# Patient Record
Sex: MALE | Race: BLACK OR AFRICAN AMERICAN | Employment: UNEMPLOYED | URBAN - METROPOLITAN AREA
[De-identification: names, ages, dates, MRNs, and addresses within clinical notes are randomized per-mention and may not be internally consistent; named-entity substitution may affect disease eponyms.]

---

## 2021-04-07 ENCOUNTER — HOSPITAL ENCOUNTER (OUTPATIENT)
Age: 71
Setting detail: OBSERVATION
Discharge: STILL ADMITTED OR ADMINISTRATIVE | End: 2021-04-08
Attending: STUDENT IN AN ORGANIZED HEALTH CARE EDUCATION/TRAINING PROGRAM | Admitting: FAMILY MEDICINE

## 2021-04-07 ENCOUNTER — APPOINTMENT (OUTPATIENT)
Dept: CT IMAGING | Age: 71
End: 2021-04-07
Attending: EMERGENCY MEDICINE

## 2021-04-07 ENCOUNTER — APPOINTMENT (OUTPATIENT)
Dept: GENERAL RADIOLOGY | Age: 71
End: 2021-04-07
Attending: STUDENT IN AN ORGANIZED HEALTH CARE EDUCATION/TRAINING PROGRAM

## 2021-04-07 DIAGNOSIS — E87.1 HYPONATREMIA: Primary | ICD-10-CM

## 2021-04-07 DIAGNOSIS — R26.81 GAIT INSTABILITY: ICD-10-CM

## 2021-04-07 DIAGNOSIS — R41.82 ALTERED MENTAL STATUS, UNSPECIFIED ALTERED MENTAL STATUS TYPE: ICD-10-CM

## 2021-04-07 LAB
APPEARANCE UR: CLEAR
BACTERIA URNS QL MICRO: NEGATIVE /HPF
BILIRUB UR QL: NEGATIVE
BNP SERPL-MCNC: 659 PG/ML
COLOR UR: ABNORMAL
EPITH CASTS URNS QL MICRO: ABNORMAL /LPF
GLUCOSE UR STRIP.AUTO-MCNC: 500 MG/DL
HGB UR QL STRIP: ABNORMAL
HYALINE CASTS URNS QL MICRO: ABNORMAL /LPF (ref 0–5)
KETONES UR QL STRIP.AUTO: ABNORMAL MG/DL
LEUKOCYTE ESTERASE UR QL STRIP.AUTO: NEGATIVE
NITRITE UR QL STRIP.AUTO: NEGATIVE
PH UR STRIP: 6.5 [PH] (ref 5–8)
PROT UR STRIP-MCNC: 30 MG/DL
RBC #/AREA URNS HPF: ABNORMAL /HPF (ref 0–5)
SP GR UR REFRACTOMETRY: 1.01 (ref 1–1.03)
UR CULT HOLD, URHOLD: NORMAL
UROBILINOGEN UR QL STRIP.AUTO: 0.2 EU/DL (ref 0.2–1)
WBC URNS QL MICRO: ABNORMAL /HPF (ref 0–4)

## 2021-04-07 PROCEDURE — 84484 ASSAY OF TROPONIN QUANT: CPT

## 2021-04-07 PROCEDURE — 74011250636 HC RX REV CODE- 250/636: Performed by: STUDENT IN AN ORGANIZED HEALTH CARE EDUCATION/TRAINING PROGRAM

## 2021-04-07 PROCEDURE — 96360 HYDRATION IV INFUSION INIT: CPT

## 2021-04-07 PROCEDURE — 83880 ASSAY OF NATRIURETIC PEPTIDE: CPT

## 2021-04-07 PROCEDURE — 99284 EMERGENCY DEPT VISIT MOD MDM: CPT

## 2021-04-07 PROCEDURE — 70450 CT HEAD/BRAIN W/O DYE: CPT

## 2021-04-07 PROCEDURE — 36415 COLL VENOUS BLD VENIPUNCTURE: CPT

## 2021-04-07 PROCEDURE — 81001 URINALYSIS AUTO W/SCOPE: CPT

## 2021-04-07 PROCEDURE — 85025 COMPLETE CBC W/AUTO DIFF WBC: CPT

## 2021-04-07 PROCEDURE — 93005 ELECTROCARDIOGRAM TRACING: CPT

## 2021-04-07 PROCEDURE — 80053 COMPREHEN METABOLIC PANEL: CPT

## 2021-04-07 PROCEDURE — 71045 X-RAY EXAM CHEST 1 VIEW: CPT

## 2021-04-07 RX ORDER — PRAZOSIN HYDROCHLORIDE 5 MG/1
5 CAPSULE ORAL
Status: DISCONTINUED | OUTPATIENT
Start: 2021-04-07 | End: 2021-04-08

## 2021-04-07 RX ORDER — HYDRALAZINE HYDROCHLORIDE 20 MG/ML
10 INJECTION INTRAMUSCULAR; INTRAVENOUS
Status: DISCONTINUED | OUTPATIENT
Start: 2021-04-07 | End: 2021-04-08 | Stop reason: HOSPADM

## 2021-04-07 RX ORDER — SODIUM CHLORIDE 9 MG/ML
100 INJECTION, SOLUTION INTRAVENOUS CONTINUOUS
Status: DISCONTINUED | OUTPATIENT
Start: 2021-04-07 | End: 2021-04-08 | Stop reason: HOSPADM

## 2021-04-07 RX ADMIN — SODIUM CHLORIDE 500 ML: 9 INJECTION, SOLUTION INTRAVENOUS at 21:47

## 2021-04-07 RX ADMIN — SODIUM CHLORIDE 100 ML/HR: 900 INJECTION, SOLUTION INTRAVENOUS at 22:03

## 2021-04-07 NOTE — ED TRIAGE NOTES
Triage Note: Patient is coming in with confusion and leg swelling that started today. Denies chest pain and shortness of breath.

## 2021-04-08 ENCOUNTER — HOSPITAL ENCOUNTER (INPATIENT)
Age: 71
LOS: 1 days | Discharge: HOME | DRG: 640 | End: 2021-04-08
Attending: INTERNAL MEDICINE | Admitting: INTERNAL MEDICINE
Payer: MEDICARE

## 2021-04-08 ENCOUNTER — APPOINTMENT (OUTPATIENT)
Dept: MRI IMAGING | Age: 71
DRG: 640 | End: 2021-04-08
Attending: INTERNAL MEDICINE
Payer: MEDICARE

## 2021-04-08 ENCOUNTER — APPOINTMENT (OUTPATIENT)
Dept: CT IMAGING | Age: 71
End: 2021-04-08
Attending: FAMILY MEDICINE

## 2021-04-08 VITALS
WEIGHT: 130.1 LBS | SYSTOLIC BLOOD PRESSURE: 143 MMHG | HEART RATE: 87 BPM | DIASTOLIC BLOOD PRESSURE: 71 MMHG | TEMPERATURE: 99.1 F | RESPIRATION RATE: 28 BRPM | BODY MASS INDEX: 18.15 KG/M2 | OXYGEN SATURATION: 98 %

## 2021-04-08 VITALS
BODY MASS INDEX: 22.83 KG/M2 | RESPIRATION RATE: 15 BRPM | WEIGHT: 133.7 LBS | TEMPERATURE: 97.5 F | DIASTOLIC BLOOD PRESSURE: 62 MMHG | OXYGEN SATURATION: 99 % | SYSTOLIC BLOOD PRESSURE: 137 MMHG | HEART RATE: 76 BPM | HEIGHT: 64 IN

## 2021-04-08 PROBLEM — E87.1 HYPONATREMIA: Status: ACTIVE | Noted: 2021-04-08

## 2021-04-08 LAB
ALBUMIN SERPL-MCNC: 4 G/DL (ref 3.5–5)
ALBUMIN/GLOB SERPL: 1 {RATIO} (ref 1.1–2.2)
ALP SERPL-CCNC: 84 U/L (ref 45–117)
ALT SERPL-CCNC: 35 U/L (ref 12–78)
AMMONIA PLAS-SCNC: 25 UMOL/L
AMPHET UR QL SCN: NEGATIVE
ANION GAP SERPL CALC-SCNC: 6 MMOL/L (ref 5–15)
ANION GAP SERPL CALC-SCNC: 8 MMOL/L (ref 5–15)
ANION GAP SERPL CALC-SCNC: 8 MMOL/L (ref 5–15)
ARTERIAL PATENCY WRIST A: YES
AST SERPL-CCNC: 32 U/L (ref 15–37)
ATRIAL RATE: 82 BPM
BARBITURATES UR QL SCN: NEGATIVE
BASE EXCESS BLD CALC-SCNC: 1 MMOL/L
BASOPHILS # BLD: 0 K/UL (ref 0–0.1)
BASOPHILS NFR BLD: 0 % (ref 0–1)
BDY SITE: ABNORMAL
BENZODIAZ UR QL: NEGATIVE
BILIRUB SERPL-MCNC: 0.5 MG/DL (ref 0.2–1)
BUN SERPL-MCNC: 16 MG/DL (ref 6–20)
BUN SERPL-MCNC: 17 MG/DL (ref 6–20)
BUN SERPL-MCNC: 22 MG/DL (ref 6–20)
BUN/CREAT SERPL: 17 (ref 12–20)
BUN/CREAT SERPL: 19 (ref 12–20)
BUN/CREAT SERPL: 19 (ref 12–20)
CA-I BLD-SCNC: 1.16 MMOL/L (ref 1.12–1.32)
CALCIUM SERPL-MCNC: 8.1 MG/DL (ref 8.5–10.1)
CALCIUM SERPL-MCNC: 8.2 MG/DL (ref 8.5–10.1)
CALCIUM SERPL-MCNC: 9.4 MG/DL (ref 8.5–10.1)
CALCULATED P AXIS, ECG09: 57 DEGREES
CALCULATED R AXIS, ECG10: -34 DEGREES
CALCULATED T AXIS, ECG11: 63 DEGREES
CANNABINOIDS UR QL SCN: NEGATIVE
CHLORIDE SERPL-SCNC: 88 MMOL/L (ref 97–108)
CHLORIDE SERPL-SCNC: 95 MMOL/L (ref 97–108)
CHLORIDE SERPL-SCNC: 96 MMOL/L (ref 97–108)
CHOLEST SERPL-MCNC: 109 MG/DL
CK SERPL-CCNC: 197 U/L (ref 39–308)
CO2 SERPL-SCNC: 24 MMOL/L (ref 21–32)
CO2 SERPL-SCNC: 27 MMOL/L (ref 21–32)
CO2 SERPL-SCNC: 28 MMOL/L (ref 21–32)
COCAINE UR QL SCN: NEGATIVE
COMMENT, HOLDF: NORMAL
CORTIS AM PEAK SERPL-MCNC: 6.5 UG/DL (ref 4.3–22.45)
CREAT SERPL-MCNC: 0.84 MG/DL (ref 0.7–1.3)
CREAT SERPL-MCNC: 0.99 MG/DL (ref 0.7–1.3)
CREAT SERPL-MCNC: 1.18 MG/DL (ref 0.7–1.3)
DIAGNOSIS, 93000: NORMAL
DIFFERENTIAL METHOD BLD: ABNORMAL
DRUG SCRN COMMENT,DRGCM: NORMAL
EOSINOPHIL # BLD: 0 K/UL (ref 0–0.4)
EOSINOPHIL NFR BLD: 0 % (ref 0–7)
ERYTHROCYTE [DISTWIDTH] IN BLOOD BY AUTOMATED COUNT: 11.7 % (ref 11.5–14.5)
ERYTHROCYTE [DISTWIDTH] IN BLOOD BY AUTOMATED COUNT: 11.7 % (ref 11.5–14.5)
EST. AVERAGE GLUCOSE BLD GHB EST-MCNC: 160 MG/DL
EST. AVERAGE GLUCOSE BLD GHB EST-MCNC: 160 MG/DL
ETHANOL SERPL-MCNC: <10 MG/DL
FOLATE SERPL-MCNC: 42.7 NG/ML (ref 5–21)
GAS FLOW.O2 O2 DELIVERY SYS: ABNORMAL L/MIN
GLOBULIN SER CALC-MCNC: 4.2 G/DL (ref 2–4)
GLUCOSE BLD STRIP.AUTO-MCNC: 159 MG/DL (ref 65–100)
GLUCOSE BLD STRIP.AUTO-MCNC: 160 MG/DL (ref 65–100)
GLUCOSE SERPL-MCNC: 155 MG/DL (ref 65–100)
GLUCOSE SERPL-MCNC: 192 MG/DL (ref 65–100)
GLUCOSE SERPL-MCNC: 192 MG/DL (ref 65–100)
HBA1C MFR BLD: 7.2 % (ref 4–5.6)
HBA1C MFR BLD: 7.2 % (ref 4–5.6)
HCO3 BLD-SCNC: 25 MMOL/L (ref 22–26)
HCT VFR BLD AUTO: 33.6 % (ref 36.6–50.3)
HCT VFR BLD AUTO: 38.7 % (ref 36.6–50.3)
HDLC SERPL-MCNC: 48 MG/DL
HDLC SERPL: 2.3 {RATIO} (ref 0–5)
HGB BLD-MCNC: 11.6 G/DL (ref 12.1–17)
HGB BLD-MCNC: 13.2 G/DL (ref 12.1–17)
IMM GRANULOCYTES # BLD AUTO: 0.1 K/UL (ref 0–0.04)
IMM GRANULOCYTES NFR BLD AUTO: 1 % (ref 0–0.5)
LDLC SERPL CALC-MCNC: 44.2 MG/DL (ref 0–100)
LIPID PROFILE,FLP: NORMAL
LYMPHOCYTES # BLD: 0.7 K/UL (ref 0.8–3.5)
LYMPHOCYTES NFR BLD: 11 % (ref 12–49)
MCH RBC QN AUTO: 30.7 PG (ref 26–34)
MCH RBC QN AUTO: 31.3 PG (ref 26–34)
MCHC RBC AUTO-ENTMCNC: 34.1 G/DL (ref 30–36.5)
MCHC RBC AUTO-ENTMCNC: 34.5 G/DL (ref 30–36.5)
MCV RBC AUTO: 90 FL (ref 80–99)
MCV RBC AUTO: 90.6 FL (ref 80–99)
METHADONE UR QL: NEGATIVE
MONOCYTES # BLD: 0.7 K/UL (ref 0–1)
MONOCYTES NFR BLD: 12 % (ref 5–13)
NEUTS SEG # BLD: 4.6 K/UL (ref 1.8–8)
NEUTS SEG NFR BLD: 76 % (ref 32–75)
NRBC # BLD: 0 K/UL (ref 0–0.01)
NRBC # BLD: 0 K/UL (ref 0–0.01)
NRBC BLD-RTO: 0 PER 100 WBC
NRBC BLD-RTO: 0 PER 100 WBC
OPIATES UR QL: NEGATIVE
OSMOLALITY UR: 367 MOSM/KG H2O
P-R INTERVAL, ECG05: 176 MS
PCO2 BLD: 38.7 MMHG (ref 35–45)
PCP UR QL: NEGATIVE
PH BLD: 7.42 [PH] (ref 7.35–7.45)
PLATELET # BLD AUTO: 179 K/UL (ref 150–400)
PLATELET # BLD AUTO: 197 K/UL (ref 150–400)
PMV BLD AUTO: 10.3 FL (ref 8.9–12.9)
PMV BLD AUTO: 9.9 FL (ref 8.9–12.9)
PO2 BLD: 77 MMHG (ref 80–100)
POTASSIUM SERPL-SCNC: 3.9 MMOL/L (ref 3.5–5.1)
POTASSIUM SERPL-SCNC: 4 MMOL/L (ref 3.5–5.1)
POTASSIUM SERPL-SCNC: 4.1 MMOL/L (ref 3.5–5.1)
PROT SERPL-MCNC: 8.2 G/DL (ref 6.4–8.2)
Q-T INTERVAL, ECG07: 326 MS
QRS DURATION, ECG06: 86 MS
QTC CALCULATION (BEZET), ECG08: 380 MS
RBC # BLD AUTO: 3.71 M/UL (ref 4.1–5.7)
RBC # BLD AUTO: 4.3 M/UL (ref 4.1–5.7)
RBC MORPH BLD: ABNORMAL
RPR SER QL: NONREACTIVE
SAMPLES BEING HELD,HOLD: NORMAL
SAO2 % BLD: 96 % (ref 92–97)
SERVICE CMNT-IMP: ABNORMAL
SERVICE CMNT-IMP: ABNORMAL
SODIUM SERPL-SCNC: 123 MMOL/L (ref 136–145)
SODIUM SERPL-SCNC: 128 MMOL/L (ref 136–145)
SODIUM SERPL-SCNC: 129 MMOL/L (ref 136–145)
SODIUM UR-SCNC: 66 MMOL/L
SPECIMEN TYPE: ABNORMAL
TOTAL RESP. RATE, ITRR: 28
TRIGL SERPL-MCNC: 84 MG/DL (ref ?–150)
TROPONIN I SERPL-MCNC: <0.05 NG/ML
TSH SERPL DL<=0.05 MIU/L-ACNC: 1.62 UIU/ML (ref 0.36–3.74)
VENTRICULAR RATE, ECG03: 82 BPM
VIT B12 SERPL-MCNC: 327 PG/ML (ref 193–986)
VLDLC SERPL CALC-MCNC: 16.8 MG/DL
WBC # BLD AUTO: 4.1 K/UL (ref 4.1–11.1)
WBC # BLD AUTO: 6.1 K/UL (ref 4.1–11.1)

## 2021-04-08 PROCEDURE — 82803 BLOOD GASES ANY COMBINATION: CPT

## 2021-04-08 PROCEDURE — 97161 PT EVAL LOW COMPLEX 20 MIN: CPT

## 2021-04-08 PROCEDURE — 97165 OT EVAL LOW COMPLEX 30 MIN: CPT

## 2021-04-08 PROCEDURE — 70496 CT ANGIOGRAPHY HEAD: CPT

## 2021-04-08 PROCEDURE — 82607 VITAMIN B-12: CPT

## 2021-04-08 PROCEDURE — 80061 LIPID PANEL: CPT

## 2021-04-08 PROCEDURE — 82746 ASSAY OF FOLIC ACID SERUM: CPT

## 2021-04-08 PROCEDURE — 74011000636 HC RX REV CODE- 636: Performed by: RADIOLOGY

## 2021-04-08 PROCEDURE — 86592 SYPHILIS TEST NON-TREP QUAL: CPT

## 2021-04-08 PROCEDURE — 80048 BASIC METABOLIC PNL TOTAL CA: CPT

## 2021-04-08 PROCEDURE — 82140 ASSAY OF AMMONIA: CPT

## 2021-04-08 PROCEDURE — 97116 GAIT TRAINING THERAPY: CPT

## 2021-04-08 PROCEDURE — 74011250636 HC RX REV CODE- 250/636: Performed by: INTERNAL MEDICINE

## 2021-04-08 PROCEDURE — 36600 WITHDRAWAL OF ARTERIAL BLOOD: CPT

## 2021-04-08 PROCEDURE — 97535 SELF CARE MNGMENT TRAINING: CPT

## 2021-04-08 PROCEDURE — 74011250636 HC RX REV CODE- 250/636: Performed by: STUDENT IN AN ORGANIZED HEALTH CARE EDUCATION/TRAINING PROGRAM

## 2021-04-08 PROCEDURE — 82077 ASSAY SPEC XCP UR&BREATH IA: CPT

## 2021-04-08 PROCEDURE — 83036 HEMOGLOBIN GLYCOSYLATED A1C: CPT

## 2021-04-08 PROCEDURE — 82962 GLUCOSE BLOOD TEST: CPT

## 2021-04-08 PROCEDURE — 82533 TOTAL CORTISOL: CPT

## 2021-04-08 PROCEDURE — 70498 CT ANGIOGRAPHY NECK: CPT

## 2021-04-08 PROCEDURE — 74011250637 HC RX REV CODE- 250/637: Performed by: INTERNAL MEDICINE

## 2021-04-08 PROCEDURE — 80307 DRUG TEST PRSMV CHEM ANLYZR: CPT

## 2021-04-08 PROCEDURE — 84300 ASSAY OF URINE SODIUM: CPT

## 2021-04-08 PROCEDURE — 36415 COLL VENOUS BLD VENIPUNCTURE: CPT

## 2021-04-08 PROCEDURE — 99204 OFFICE O/P NEW MOD 45 MIN: CPT | Performed by: NURSE PRACTITIONER

## 2021-04-08 PROCEDURE — 70551 MRI BRAIN STEM W/O DYE: CPT

## 2021-04-08 PROCEDURE — 65660000000 HC RM CCU STEPDOWN

## 2021-04-08 PROCEDURE — 99218 HC RM OBSERVATION: CPT

## 2021-04-08 PROCEDURE — 82550 ASSAY OF CK (CPK): CPT

## 2021-04-08 PROCEDURE — 74011636637 HC RX REV CODE- 636/637: Performed by: INTERNAL MEDICINE

## 2021-04-08 PROCEDURE — 96360 HYDRATION IV INFUSION INIT: CPT

## 2021-04-08 PROCEDURE — 85027 COMPLETE CBC AUTOMATED: CPT

## 2021-04-08 PROCEDURE — 83935 ASSAY OF URINE OSMOLALITY: CPT

## 2021-04-08 PROCEDURE — 84443 ASSAY THYROID STIM HORMONE: CPT

## 2021-04-08 RX ORDER — ACETAMINOPHEN 325 MG/1
650 TABLET ORAL
Status: DISCONTINUED | OUTPATIENT
Start: 2021-04-08 | End: 2021-04-08 | Stop reason: HOSPADM

## 2021-04-08 RX ORDER — ATORVASTATIN CALCIUM 40 MG/1
40 TABLET, FILM COATED ORAL
Status: DISCONTINUED | OUTPATIENT
Start: 2021-04-08 | End: 2021-04-08 | Stop reason: HOSPADM

## 2021-04-08 RX ORDER — INSULIN LISPRO 100 [IU]/ML
INJECTION, SOLUTION INTRAVENOUS; SUBCUTANEOUS
Status: DISCONTINUED | OUTPATIENT
Start: 2021-04-08 | End: 2021-04-08 | Stop reason: HOSPADM

## 2021-04-08 RX ORDER — ACETAMINOPHEN 650 MG/1
650 SUPPOSITORY RECTAL
Status: DISCONTINUED | OUTPATIENT
Start: 2021-04-08 | End: 2021-04-08 | Stop reason: HOSPADM

## 2021-04-08 RX ORDER — DEXTROSE 50 % IN WATER (D50W) INTRAVENOUS SYRINGE
25-50 AS NEEDED
Status: DISCONTINUED | OUTPATIENT
Start: 2021-04-08 | End: 2021-04-08 | Stop reason: HOSPADM

## 2021-04-08 RX ORDER — ENOXAPARIN SODIUM 100 MG/ML
40 INJECTION SUBCUTANEOUS EVERY 24 HOURS
Status: DISCONTINUED | OUTPATIENT
Start: 2021-04-08 | End: 2021-04-08 | Stop reason: HOSPADM

## 2021-04-08 RX ORDER — SODIUM CHLORIDE 9 MG/ML
100 INJECTION, SOLUTION INTRAVENOUS CONTINUOUS
Status: DISCONTINUED | OUTPATIENT
Start: 2021-04-08 | End: 2021-04-08

## 2021-04-08 RX ORDER — ONDANSETRON 2 MG/ML
4 INJECTION INTRAMUSCULAR; INTRAVENOUS
Status: DISCONTINUED | OUTPATIENT
Start: 2021-04-08 | End: 2021-04-08 | Stop reason: HOSPADM

## 2021-04-08 RX ORDER — GUAIFENESIN 100 MG/5ML
81 LIQUID (ML) ORAL DAILY
Status: DISCONTINUED | OUTPATIENT
Start: 2021-04-08 | End: 2021-04-08 | Stop reason: HOSPADM

## 2021-04-08 RX ORDER — DEXTROSE 50 % IN WATER (D50W) INTRAVENOUS SYRINGE
12.5-25 AS NEEDED
Status: DISCONTINUED | OUTPATIENT
Start: 2021-04-08 | End: 2021-04-08 | Stop reason: HOSPADM

## 2021-04-08 RX ORDER — MAGNESIUM SULFATE 100 %
4 CRYSTALS MISCELLANEOUS AS NEEDED
Status: DISCONTINUED | OUTPATIENT
Start: 2021-04-08 | End: 2021-04-08 | Stop reason: HOSPADM

## 2021-04-08 RX ADMIN — IOPAMIDOL 100 ML: 755 INJECTION, SOLUTION INTRAVENOUS at 01:06

## 2021-04-08 RX ADMIN — INSULIN LISPRO 2 UNITS: 100 INJECTION, SOLUTION INTRAVENOUS; SUBCUTANEOUS at 16:56

## 2021-04-08 RX ADMIN — ASPIRIN 81 MG: 81 TABLET, CHEWABLE ORAL at 12:14

## 2021-04-08 RX ADMIN — INSULIN LISPRO 2 UNITS: 100 INJECTION, SOLUTION INTRAVENOUS; SUBCUTANEOUS at 12:15

## 2021-04-08 RX ADMIN — SODIUM CHLORIDE 100 ML/HR: 9 INJECTION, SOLUTION INTRAVENOUS at 10:51

## 2021-04-08 RX ADMIN — SODIUM CHLORIDE 100 ML/HR: 900 INJECTION, SOLUTION INTRAVENOUS at 05:37

## 2021-04-08 RX ADMIN — ENOXAPARIN SODIUM 40 MG: 40 INJECTION SUBCUTANEOUS at 15:01

## 2021-04-08 NOTE — CONSULTS
NEPHROLOGY CONSULT NOTE     Patient: Caesar Borges MRN: [id number]  PCP: None   :     1950  Age:   69 y.o. Sex:  male      Referring physician: Danica Vail MD  Reason for consultation: 69 y.o. male with Hyponatremia [T99.0] complicated by LEILANI   Admission Date: 2021  9:45 AM  LOS: 0 days      ASSESSMENT and PLAN :   Acute Hyponatremia:  - 2/2 thiazide use  - corrected 6 meq already  - d/c IVF and repeat labs now  - may need D5W to slow rate of corretion    HTN:  - avoid thiazides  - BP stable now    DM2:  - on insulin     Active Problems / Assessment AAActive  : Active Problems:    Hyponatremia (2021)         Subjective:   HPI: Caesar Borges is a 69 y.o.  male who has been admitted to the hospital for hyponatremia. Labs revealed a Na of 123. He has a history of HTN, DM, HLD. He was on lisinopril and HCTZ which was recently prescribed about a week ago. He was started on IVF and his Na improved to 129 as of 3am this morning. No cp, sob, n/v/d, confusion reported. Past Medical Hx:   No past medical history on file. Past Surgical Hx:   No past surgical history on file. Medications:  Prior to Admission medications    Not on File       Not on File    Social Hx:       No family history on file. Review of Systems:  A twelve point review of system was performed today. Pertinent positives and negatives are mentioned in the HPI. The reminder of the ROS is negative and noncontributory. Objective:    Vitals:    Vitals:    21 1019   Pulse: 81   Resp: 20   SpO2: 95%     I&O's:  No intake/output data recorded. Visit Vitals  Pulse 81   Resp 20   SpO2 95%       Physical Exam:  General:Alert, No distress,   HEENT: Eyes are PERRL. Conjunctiva without pallor ,erythema. The sclerae without icterus.  .   Neck:Supple,no mass palpable  Lungs : Clears to auscultation Bilaterally, Normal respiratory effort  CVS: RRR, S1 S2 normal, No rub, no LE edema  Abdomen: Soft, Non tender, No hepatosplenomegaly, bowel sounds present  Extremities: No cyanosis, No clubbing  Skin: No rash or lesions. Lymph nodes: No palpable nodes  MS: No joint swelling, erythema, warmth  Neurologic: non focal, AAO x 3  Psych: normal affect    Laboratory Results:    No results found for: BUN, NA, K, CL, CO2    No results found for: BUN, K    No results found for: WBC, RBC, HGB, HCT, MCV, MCH, RDW, PLT, HGBEXT, HCTEXT, PLTEXT    No results found for: PTH, PHOS    Urine dipstick:   No results found for: COLOR, APPRN, SPGRU, REFSG, LOIS, PROTU, GLUCU, KETU, BILU, UROU, JEAN PAUL, LEUKU, GLUKE, EPSU, BACTU, WBCU, RBCU, CASTS, UCRY           Thank you for allowing us to participate in the care of this patient. We will follow patient. Please dont hesitate to call with any questions    Diandra Flores MD  4/8/2021      [facility] [address]  Phone - [phone]   Fax - [phone]  [email or web address]

## 2021-04-08 NOTE — DISCHARGE SUMMARY
Discharge Summary     Patient:  Linette Perez       MRN: [id number]       YOB: 1950       Age: 69 y.o. Date of admission:  4/8/2021    Date of discharge:  4/8/2021    Primary care provider: Dr. Mathieu Carver provider:  Teetee Contreras MD    Discharging provider:  Kadi Dong [address] [phone]. If unavailable, call [phone] and ask the  to page the triage hospitalist.    Consultations  · IP CONSULT TO NEUROLOGY  · IP CONSULT TO NEPHROLOGY    Procedures  · * No surgery found *    Discharge destination: home. The patient is stable for discharge. Admission diagnosis  · Hyponatremia [E87.1]    There are no discharge medications for this patient. Follow-up Information     Follow up With Specialties Details Why Contact Info    None    None ([phone]) Patient stated that they have no PCP            Final discharge diagnoses and brief hospital course  Sondra Crystal is a 69 y. o. male with pmhx HTN, and DM II, presents for confusion, b/l leg swelling, and difficulty walking for one week.  Per wife, who gives the majority of the hx, he is usually alert and oriented with no difficulty ambulation.  For the past two weeks, he has had an unsteady gait, needing to hold on to furniture for aid, intermittent confusion, and lethargy described as falling asleep frequently.  She called her children, one who is a physician who recommended that he be evaluated in the hospital. Orestes Dumont states that he was recently switched from atacand/hct to losartan/hct, and started on norvasc 5mg.  The norvasc 5mg was then decreased to 2.5mg due to LE edema. In the ED, he was hypertensive to 177/74.  Labs were significant for probnp 659, glucosuria, proteinuria, and moderate blood with no RBC's on micro     Acute Encephalopathy - improved  likely due to hyponatremia   -pt's wife reports sleepiness, and lethargy, ataxia, and intermittent confusion for the past several weeks  -CT head with no acute intracranial process, but areas of non-specific parenchymal hypodensity favoiring chronic microangiopathy   - CTA head/ neck: No evidence for acute large vessel arterial occlusion or significant stenosis. - normal NH3, TSH,  LDL. UDS - negative. Normal ABG  - appreciate neurology input  - MRI brain: No acute intracranial abnormality. Generalized parenchymal volume loss and moderate to severe chronic microvascular ischemic disease     #  Hyponatremia - improved 128 on discharge   Due to HCTZ use   -VQ 812 on poa , 125 corrected for glucose  - discontinued hctz  - s/p IVF   - appreciate nephrology input  - will monitor      #Ataxia could be due to hyponatremia  - improved   -wife c/o impaired gait for the past several weeks  - PT/OT - gait normal      #HTN  -prn hydralazine  -hold home norvasc 2/2 LE edema, hold losartan/hct 2/2 hyponatremia  - BP has been stable during hospital stay with no BP meds     #Daytime Sleepiness  -pt snores per wife, and was evaluated for sleep apnea in the past, he never rec'd C-pap, results of eval unclear  -need sleep study OP     #Abnormal UA  -UA with blood, glucose and protien, no RBC's on micro  - normal CK     #DM II  - A1c 7. 2. hold home metformin  -lispro ISS    Discharge recommendations:  PCP f/u in 1 week  BMP on Monday  Monitor BP daily, if persistently high >170, may consider losartan alone    Discharge plan discussed in detail with patient and his son at bedside.  They understood and agreed       Physical examination at discharge  Visit Vitals  /62 (BP 1 Location: Left arm, BP Patient Position: At rest;Lying)   Pulse 76   Temp 97.5 °F (36.4 °C)   Resp 15   Ht 5' 4\" (1.626 m)   Wt 60.6 kg (133 lb 11.2 oz)   SpO2 99%   BMI 22.95 kg/m²     GENERAL:  Alert, oriented, cooperative, no apparent distress  HEENT:  Normocephalic, atraumatic, non icteric sclerae, non pallor conjuctivae, EOMs intact, PERRLA. NECK: Supple, trachea midline, no adenopathy, no thyromegally or tenderness, no carotid bruit and no JVD. LUNGS:   Vesicular breath sounds bilaterally, no added sounds. HEART:   S1 and S2 well heard,RRR,  no murmur, click, rub or gallop. ABDOMEB:   Soft, non-tender. Normoactive bowel sounds. No masses,  No organomegaly. EXTREMETIES:  Atraumatic, acyanotic, no edema  PULSES: 2+ and symmetric all extremities. SKIN:  No rashes or lesions  NEUROLOGY: Alert, CNII-XII intact. Motor and sensory exam grossly intact. Pertinent imaging studies:    Per EMR     Recent Labs     04/08/21  1156   WBC 4.1   HGB 11.6*   HCT 33.6*        Recent Labs     04/08/21  1156   *   K 3.9   CL 96*   CO2 24   BUN 16   CREA 0.84   *   CA 8.1*     No results for input(s): AP, TBIL, TP, ALB, GLOB, GGT, AML, LPSE in the last 72 hours. No lab exists for component: SGOT, GPT, AMYP, HLPSE  No results for input(s): INR, PTP, APTT, INREXT in the last 72 hours. No results for input(s): FE, TIBC, PSAT, FERR in the last 72 hours. No results for input(s): PH, PCO2, PO2 in the last 72 hours. No results for input(s): CPK, CKMB in the last 72 hours. No lab exists for component: TROPONINI  No components found for: [place]    Chronic Diagnoses:    Problem List as of 4/8/2021 Never Reviewed          Codes Class Noted - Resolved    Hyponatremia ICD-10-CM: E87.1  ICD-9-CM: 276.1  4/8/2021 - Present              Time spent on discharge related activities today greater than 30 minutes.       Signed:  Colonel Deondre MD                 Hospitalist, Internal Medicine      Cc: None

## 2021-04-08 NOTE — ED NOTES
Patient resting at this time without any apparent distress. He repositions himself. Call bell within reach.

## 2021-04-08 NOTE — PROGRESS NOTES
Speech Therapy    Orders received and chart reviewed. Spoke with RN who reports patient is eating and there are no SLP needs identified. Will complete SLP order. Thank you. Jing Rosa M.S., CCC-SLP

## 2021-04-08 NOTE — DISCHARGE INSTRUCTIONS
Please bring this form with you to show your primary care provider at your follow-up appointment. Primary care provider:  Dr. Nanda Salazar    Discharging provider:  Heidi Adam MD    You have been admitted to the hospital with the following diagnoses:  · Hyponatremia [E87.1]    FOLLOW-UP CARE RECOMMENDATIONS:    Hold home BP meds losartan/ HCTZ. Monitor Blood pressure daily, if  Persistently high >170, please talk to your primary care doctor in restarting losartan alone. APPOINTMENTS:  · Follow-up with primary care provider in 1 week    FOLLOW-UP TESTS recommended: BMP on Monday     PENDING TEST RESULTS:  At the time of your discharge the following test results are still pending: none   Please make sure you review these results with your outpatient follow-up provider(s). SYMPTOMS to watch for: chest pain, shortness of breath, fever, chills, nausea, vomiting, diarrhea, change in mentation, falling, weakness, bleeding. DIET/what to eat:  Diabetic Diet    ACTIVITY:  Activity as tolerated    What to do if new or unexpected symptoms occur? If you experience any of the above symptoms (or should other concerns or questions arise after discharge) please call your primary care physician. Return to the emergency room if you cannot get hold of your doctor. · It is very important that you keep your follow-up appointment(s). · Please bring discharge papers, medication list (and/or medication bottles) to your follow-up appointments for review by your outpatient provider(s). · Please check the list of medications and be sure it includes every medication (even non-prescription medications) that your provider wants you to take. · It is important that you take the medication exactly as they are prescribed. · Keep your medication in the bottles provided by the pharmacist and keep a list of the medication names, dosages, and times to be taken in your wallet.    · Do not take other medications without consulting your doctor. · If you have any questions about your medications or other instructions, please talk to your nurse or care provider before you leave the hospital.    I understand that if any problems occur once I am at home I am to contact my physician. These instructions were explained to me and I had the opportunity to ask questions. Patient Education        Hyponatremia: Care Instructions  Your Care Instructions     Hyponatremia (say \"mt-vw-did-TREE-che-uh\") means that you don't have enough sodium in your blood. It can cause nausea, vomiting, and headaches. Or you may not feel hungry. In serious cases, it can cause seizures, a coma, or even death. Hyponatremia is not a disease. It is a problem caused by something else, such as medicines or exercising for a long time in hot weather. You can get hyponatremia if you lose a lot of fluids and then you drink a lot of water or other liquids that don't have much sodium. You can also get it if you have kidney, liver, heart, or other health problems. Treatment is focused on getting your sodium levels back to normal.  Follow-up care is a key part of your treatment and safety. Be sure to make and go to all appointments, and call your doctor if you are having problems. It's also a good idea to know your test results and keep a list of the medicines you take. How can you care for yourself at home? · If your doctor recommends it, drink fluids that have sodium. Sports drinks are a good choice. Or you can eat salty foods. · If your doctor recommends it, limit the amount of water you drink. And limit fluids that are mostly water. These include tea, coffee, and juice. · Take your medicines exactly as prescribed. Call your doctor if you have any problems with your medicine. · Get your sodium levels tested when your doctor tells you to. When should you call for help? Call 911 anytime you think you may need emergency care. For example, call if:    · You have a seizure.   · You passed out (lost consciousness). Call your doctor now or seek immediate medical care if:    · You are confused or it is hard to focus.     · You have little or no appetite.     · You feel sick to your stomach or you vomit.     · You have a headache.     · You have mood changes.     · You feel more tired than usual.   Watch closely for changes in your health, and be sure to contact your doctor if:    · You do not get better as expected. Where can you learn more? Go to http://www.gray.com/  Enter U075 in the search box to learn more about \"Hyponatremia: Care Instructions. \"  Current as of: September 23, 2020               Content Version: 12.8  © 2006-2021 [employer]. Care instructions adapted under license by [employer] (which disclaims liability or warranty for this information). If you have questions about a medical condition or this instruction, always ask your healthcare professional. [address] any warranty or liability for your use of this information.

## 2021-04-08 NOTE — PROGRESS NOTES
Problem: Diabetes Self-Management  Goal: *Disease process and treatment process  Description: Define diabetes and identify own type of diabetes; list 3 options for treating diabetes. Outcome: Resolved/Met  Goal: *Incorporating nutritional management into lifestyle  Description: Describe effect of type, amount and timing of food on blood glucose; list 3 methods for planning meals. Outcome: Resolved/Met  Goal: *Incorporating physical activity into lifestyle  Description: State effect of exercise on blood glucose levels. Outcome: Resolved/Met  Goal: *Developing strategies to promote health/change behavior  Description: Define the ABC's of diabetes; identify appropriate screenings, schedule and personal plan for screenings. Outcome: Resolved/Met  Goal: *Using medications safely  Description: State effect of diabetes medications on diabetes; name diabetes medication taking, action and side effects. Outcome: Resolved/Met  Goal: *Monitoring blood glucose, interpreting and using results  Description: Identify recommended blood glucose targets  and personal targets. Outcome: Resolved/Met  Goal: *Prevention, detection, treatment of acute complications  Description: List symptoms of hyper- and hypoglycemia; describe how to treat low blood sugar and actions for lowering  high blood glucose level. Outcome: Resolved/Met  Goal: *Prevention, detection and treatment of chronic complications  Description: Define the natural course of diabetes and describe the relationship of blood glucose levels to long term complications of diabetes.   Outcome: Resolved/Met  Goal: *Developing strategies to address psychosocial issues  Description: Describe feelings about living with diabetes; identify support needed and support network  Outcome: Resolved/Met  Goal: *Insulin pump training  Outcome: Resolved/Met  Goal: *Sick day guidelines  Outcome: Resolved/Met  Goal: *Patient Specific Goal (EDIT GOAL, INSERT TEXT)  Outcome: Resolved/Met Problem: Patient Education: Go to Patient Education Activity  Goal: Patient/Family Education  Outcome: Resolved/Met     Problem: Falls - Risk of  Goal: *Absence of Falls  Description: Document Mary Last Fall Risk and appropriate interventions in the flowsheet.   Outcome: Resolved/Met  Note: Fall Risk Interventions:  Mobility Interventions: Assess mobility with egress test, Communicate number of staff needed for ambulation/transfer, Patient to call before getting OOB                             Problem: Patient Education: Go to Patient Education Activity  Goal: Patient/Family Education  Outcome: Resolved/Met

## 2021-04-08 NOTE — H&P
[facility] Adult  Hospitalist Group  History and Physical    Date of Service:  4/7/2021    Subjective:     David Mauricio is a 69 y.o. male with pmhx HTN, and DM II, presents for confusion, b/l leg swelling, and difficulty walking for one week. Per wife, who gives the majority of the hx, he is usually alert and oriented with no difficulty ambulation. For the past two weeks, he has had an unsteady gait, needing to hold on to furniture for aid, intermittent confusion, and lethargy described as falling asleep frequently. She called her children, one who is a physician who recommended that he be evaluated in the hospital.  She states that he was recently switched from atacand/hct to losartan/hct, and started on norvasc 5mg. The norvasc 5mg was then decreased to 2.5mg due to LE edema. In the ED, he was hypertensive to 177/74. Labs were significant for probnp 659, glucosuria, proteinuria, and moderate blood with no RBC's on micro     In the ED, he rec'd 500cc ns bolus    Review of Systems:    All other review of systems negative except for what is stated HPI    PMhx  -HTN  -DM  -AME    Meds:  -losartan hctz  -metformin  -MVI  -amlodipine    Allergies:  PCN-reaction unknown    Family Hx  Brothers, and sister with HTN, and CVA  Sister with Alzheimers    SOCIAL HISTORY:  Patient resides at Home. Patient ambulates independently  Smoking history: none  Alcohol history: none    Objective:       Physical Exam:   Visit Vitals  BP (!) 177/74 (BP 1 Location: Left upper arm, BP Patient Position: Sitting)   Pulse 87   Temp 98.6 °F (37 °C)   Resp 20   SpO2 98%     General:  Lethargic, falls asleep easily during exam, arousable with vigorous tactile stimulation cooperative, no distress, appears stated age. Head:  Normocephalic, without obvious abnormality, atraumatic. Eyes:  Conjunctivae/corneas clear. PERRL, EOMs intact.             Throat: Lips, mucosa, and tongue normal.   Neck: Supple, symmetrical, trachea midline,    Back:   Symmetric, no curvature. ROM normal. N   Lungs:   Clear to auscultation bilaterally. Chest wall:  No tenderness or deformity. Heart:  Regular rate and rhythm, S1, S2 normal, no murmur, click, rub or gallop. Abdomen:   Soft, non-tender. Bowel sounds normal. No masses,  No organomegaly. Extremities: Extremities normal, atraumatic, no cyanosis or edema. Pulses: 2+ radial pulses   Skin: Skin color, texture, turgor normal. No rashes or lesions       Neurologic: CNII-XII grossly  intact. Normal strength, and sensation. Per son, pt. Has been disoriented to time     ECG: SR 84, LAD    Data Review: All diagnostic labs and studies have been reviewed. Chest x-ray: no acute cardiopulmonary process    Assessment:     Active Problems:    * No active hospital problems.  *      Plan:     #Encephalopathy  -pt's wife reports sleepiness, and lethargy, ataxia, and intermittent confusion for the past several weeks  -CT head with no acute intracranial process, but areas of non-specific parenchymal hypodensity favoiring chronic microangiopathy   -check NH3, TSH, B12, folate, RPR, ETOH, ABG and UDS, MRI  -Ua with culture pending    #Hypo osmolar, Hyponatremia  -Na 123, 125 corrected for glucose, osmolarity 269, normal PO intake per wife  -check Cher, and Usom, TSH, and cortisol  -hold hctz  -do not correct over 6-8mEQ/L in 24 hours  -repeat BMP stat after 500cc ns  -consult nephrology, appreciate rec's    #Ataxia  -wife c/o impaired gait for the past several weeks  -CTA to eval post circulation   -MRI, and neuro consult as per above    #HTN  -prn hydralazine  -hold home norvasc 2/2 LE edema, hold losartan/hct 2/2 hyponatremia    #Daytime Sleepiness  -pt snores per wife, and was evaluated for sleep apnea in the past, he never rec'd C-pap, results of eval unclear  -need sleep study OP    #Abnormal UA  -UA with blood, glucose and protien, no RBC's on micro  -check CK    #DM II  -hold home metformin  -lispro ISS    FEN: cardiac, diabetic  dvt ppx: lovenox    MPOA: Sanna Amaro (spouse)  Code: Full     FUNCTIONAL STATUS PRIOR TO HOSPITALIZATION Ambulates Independently     Signed By: Jonathan Benavides MD     April 7, 2021

## 2021-04-08 NOTE — CONSULTS
Neurology Consult Note  Colleen Castillo NP      Date of admission: 4/8/2021    Patient: Kaiser López MRN: [id number]  SSN: [id number]    YOB: 1950  Age: 69 y.o. Sex: male        Subjective:     HPI: Kaiser López is a 69 y.o. male presenting with confusion and gait instability for the past week. His wife reports that he has had difficulty walking without holding onto a wall next to him for stability. He has also been more tired and somnolent than usual.  He has had poor oral intake at home with inadequate hydration. His wife has noticed some exertional dyspnea recently but he does not feel short of breath at rest.  She was concerned about the swelling in his feet which has not responded to compression stockings. He denies any asymmetric lower extremity pain or edema, any prior history of DVT/PE. No other systemic complaints. Symptoms are moderate in nature without alleviating factors. Pt's labs significant for Na 123. Otherwise, B12 327, Folate 42.7 (H), A1C 7.2, Ammonia 25, UA neg, drug screen neg, CBC w/ diff and CMP grossly unremarkable. CTH no acute process. Regions of parenchymal hypodensity are present, nonspecific, but favoring chronic microangiopathy. CTA H&N. No evidence for acute large vessel arterial occlusion or significant stenosis. MRI images reviewed. Significant white matter disease. No obvious infarct. Awaiting formal read. Patient's exam improved per son. Not at baseline yet. Na up to 128. Gait improved and able to walk now. Review of systems  Review of systems negative except as detailed in the HPI, interval, PMH and A&P    No past medical history on file. No past surgical history on file. No family history on file.   Social History     Tobacco Use    Smoking status: Not on file   Substance Use Topics    Alcohol use: Not on file      Prior to Admission medications    Not on File     Current Facility-Administered Medications   Medication Dose Route Frequency Provider Last Rate Last Admin    acetaminophen (TYLENOL) tablet 650 mg  650 mg Oral Q4H PRN Princess Moneral MD        Or    acetaminophen (TYLENOL) solution 650 mg  650 mg Per NG tube Q4H PRN Princess Monreal MD        Or   [employer] acetaminophen (TYLENOL) suppository 650 mg  650 mg Rectal Q4H PRN Princess Monreal MD        aspirin chewable tablet 81 mg  81 mg Oral DAILY Madala, Sushma, MD   81 mg at 21 1214    atorvastatin (LIPITOR) tablet 40 mg  40 mg Oral QHS Princess Monreal MD        ondansetron (ZOFRAN) injection 4 mg  4 mg IntraVENous Q6H PRN Madala, Turner Goldmann, MD        enoxaparin (LOVENOX) injection 40 mg  40 mg SubCUTAneous Q24H Madala, Sushma, MD   40 mg at 21 1501    insulin lispro (HUMALOG) injection   SubCUTAneous AC&HS Princess Monreal MD   2 Units at 21 1215    glucose chewable tablet 16 g  4 Tab Oral PRN Princess Monreal MD        dextrose (D50W) injection syrg 12.5-25 g  12.5-25 g IntraVENous PRN Princess Monreal MD        glucagon (GLUCAGEN) injection 1 mg  1 mg IntraMUSCular PRN Princess Monreal MD            Allergies   Allergen Reactions    Penicillins Nausea and Vomiting       Objective:     Vitals:    21 1019 21 1049 21 1415 21 1420   BP:  (!) 142/69 137/62 137/62   Pulse: 81 77 76 76   Resp: 20 27 15    Temp:  98.2 °F (36.8 °C) 97.5 °F (36.4 °C) 97.5 °F (36.4 °C)   SpO2: 95% 99%          Temp (24hrs), Av.7 °F (36.5 °C), Min:97.5 °F (36.4 °C), Max:98.2 °F (36.8 °C)                No intake or output data in the 24 hours ending 21 1559    General: In NAD. Cardiac: RRR  Lungs: Unlabored breathing. Abdomen: Soft/NT/ mildly distended. Extremities. Minimal distal lower ext edema. Neurologic Exam:  Mental Status:  Alert and oriented to name yr, month, st srini's but not president    Language:    Grossly intact fluency and comprehension. Clear speech    Cranial Nerves:   Pupils equal, round and reactive to light. Visual fields intact. Extraocular movements intact w/o nystagmus      Facial sensation intact to LT     Facial activation symmetric. Hearing grossly intact. Motor:    Bulk and tone normal.      5/5 strength in all extremities. No pronator drift. No involuntary movements. Sensation:    Sensation intact throughout to light touch. Coordination & Gait: No ataxia with finger to nose. Gait deferred    LABS:  Recent Labs     04/08/21  1156   WBC 4.1   HGB 11.6*   HCT 33.6*        Recent Labs     04/08/21  1156   *   K 3.9   CL 96*   CO2 24   BUN 16   CREA 0.84   *   CA 8.1*     No results for input(s): ALT, AP, TBIL, TBILI, TP, ALB, GLOB, GGT, AML, LPSE in the last 72 hours. No lab exists for component: SGOT, GPT, AMYP, HLPSE  No results for input(s): INR, PTP, APTT, INREXT, INREXT in the last 72 hours. No results for input(s): PHI, PCO2I, PO2I, HCO3I in the last 72 hours. No results for input(s): CPK, CKNDX, TROIQ in the last 72 hours. No lab exists for component: CPKMB  Lab Results   Component Value Date/Time    Cholesterol, total 109 04/08/2021 11:56 AM    HDL Cholesterol 48 04/08/2021 11:56 AM    LDL, calculated 44.2 04/08/2021 11:56 AM    Triglyceride 84 04/08/2021 11:56 AM    CHOL/HDL Ratio 2.3 04/08/2021 11:56 AM     Lab Results   Component Value Date/Time    Glucose (POC) 160 (H) 04/08/2021 03:52 PM    Glucose (POC) 159 (H) 04/08/2021 12:04 PM     No results found for: COLOR, APPRN, SPGRU, REFSG, LOIS, PROTU, GLUCU, KETU, BILU, UROU, JEAN PAUL, LEUKU, GLUKE, EPSU, BACTU, WBCU, RBCU, CASTS, UCRY    No results for input(s): FE, TIBC, PSAT, FERR in the last 72 hours. No results found for: FOL, RBCF   No results for input(s): PH, PCO2, PO2 in the last 72 hours. No results for input(s): CPK, CKNDX, TROIQ in the last 72 hours. No lab exists for component: CPKMB    Imaging:  No results found. CT Results:  No results found for this or any previous visit.     MRI Results:  No results found for this or any previous visit. XR Results   No results found for this or any previous visit. VAS/US Results (maximum last 3): No results found for this or any previous visit. TTE         EKG  No results found for this or any previous visit. Hospital Problems  Never Reviewed          Codes Class Noted POA    Hyponatremia ICD-10-CM: E87.1  ICD-9-CM: 276.1  4/8/2021 Unknown              Assessment/Plan:     Silver Boss is a 69 y.o. male presenting with confusion and gait instability for the past week. He has also been more tired and somnolent than usual.  He has had poor oral intake at home with inadequate hydration. Found to have sodium of 123, thought to be related to HCTZ. Sodium is being corrected. His exam is improving. Cognitively not quite back to baseline per son which may be due to recovery from hyponatremia. Suspect there may be some underlying dementia by clinical exam and on MRI. Confusion and gait instability 2/2 hyponatremia. HCTZ thought to be cause and discontinued. His exam is improving. Imaging reviewed and no acute process. Will follow up formal MRI read. Will follow up formal MRI read. If no acute findings we will sign off     Thank you for this consult.     Signed By: Yesica Álvarez NP     April 8, 2021 3:02 PM

## 2021-04-08 NOTE — ED NOTES
RN assumed care; patient is alert and oriented x4 with his spouse at the bedside. Urinal provided for urine sample.

## 2021-04-08 NOTE — PROGRESS NOTES
PHYSICAL THERAPY EVALUATION/DISCHARGE  Patient: Tripp Bryant (6 y.o. male)  Date: 4/8/2021  Primary Diagnosis: Hyponatremia [E87.1]       Precautions:          ASSESSMENT  Based on the objective data described below, the patient presents with overall mobility at/near baseline function s/p admission for hyponatremia. At baseline, pt lives with his family and is independent with mobility and ADLs. This date, pt A&O x4. Neuro exam appears non focal- strength, sensation, vision, coordination, and speech all intact and at baseline level. Pt was independent with bed mobility, transferred sit<>stand with SBA, and ambulated with CGA. He demonstrated slow gait but no overt LOB or unsteadiness noted. Pt and his son report this is his baseline function and have no further mobility questions or concerns at this time. Will sign off. Functional Outcome Measure: The patient scored 45/56 on the Bautista outcome measure which is indicative of low fall risk. Other factors to consider for discharge: lives with family, symptoms largely resolved      Further skilled acute physical therapy is not indicated at this time. PLAN :  Recommendation for discharge: (in order for the patient to meet his/her long term goals)  No skilled physical therapy/ follow up rehabilitation needs identified at this time. This discharge recommendation:  Has not yet been discussed the attending provider and/or case management    IF patient discharges home will need the following DME: none       SUBJECTIVE:   Patient stated I was holding on to things when I walked at home.     OBJECTIVE DATA SUMMARY:   HISTORY:    No past medical history on file. No past surgical history on file. Prior level of function: Pt lives with wife, son, and son's family.   He is independent with mobility and ADLs    Home Situation  Home Environment: Private residence  # Steps to Enter: 4  Rails to Enter: Yes  Hand Rails : Bilateral  One/Two Story Residence: (three stories)  Living Alone: No  Support Systems: Spouse/Significant Other/Partner, Child(susie)  Current DME Used/Available at Home: None  Tub or Shower Type: Tub/Shower combination    EXAMINATION/PRESENTATION/DECISION MAKING:     Range Of Motion:  AROM: Within functional limits           PROM: Within functional limits           Strength:    Strength:  Within functional limits                    Tone & Sensation:   Tone: Normal              Sensation: Intact               Coordination:  Coordination: Within functional limits  Vision:      Functional Mobility:  Bed Mobility:  Supine to Sit: Independent  Sit to Supine: Independent  Scooting: Independent     Transfers:  Sit to Stand: Stand-by assistance  Stand to Sit: Stand-by assistance    Balance:   Sitting: Intact  Standing: Intact     Ambulation/Gait Training:  Distance (ft): 120 Feet (ft)  Assistive Device: Gait belt  Ambulation - Level of Assistance: Stand-by assistance  Speed/Amelia: Slow    Functional Measure:  Bautista Balance Test:    Sitting to Standin  Standing Unsupported: 4  Sitting with Back Unsupported: 4  Standing to Sittin  Transfers: 4  Standing Unsupported with Eyes Closed: 3  Standing Unsupported with Feet Together: 3  Reach Forward with Outstretched Arm: 3   Object: 3  Turn to Look Over Shoulders: 3  Turn 360 Degrees: 3  Alternate Foot on Step/Stool: 3  Standing Unsupported One Foot in Front: 3  Stand on One Le  Total: 45/56         56=Maximum possible score;   0-20=High fall risk  21-40=Moderate fall risk   41-56=Low fall risk        Physical Therapy Evaluation Charge Determination   History Examination Presentation Decision-Making   MEDIUM  Complexity : 1-2 comorbidities / personal factors will impact the outcome/ POC  HIGH Complexity : 4+ Standardized tests and measures addressing body structure, function, activity limitation and / or participation in recreation  LOW Complexity : Stable, uncomplicated  Other outcome measures Bautista  LOW       Based on the above components, the patient evaluation is determined to be of the following complexity level: LOW     Activity Tolerance:   Good      After treatment patient left in no apparent distress:   Supine in bed, Call bell within reach, Bed / chair alarm activated and Caregiver / family present    COMMUNICATION/EDUCATION:   The patients plan of care was discussed with: Occupational therapist and Registered nurse. Fall prevention education was provided and the patient/caregiver indicated understanding., Patient/family have participated as able in goal setting and plan of care. and Patient/family agree to work toward stated goals and plan of care.     Thank you for this referral.  Claudine Mathew, PT, DPT   Time Calculation: 18 mins

## 2021-04-08 NOTE — PROGRESS NOTES
I have reviewed discharge instructions with the patient. The patient verbalized understanding. Patient does not have a primary care provider. Discussed patient is to set up appointment with a primary care provider. PIV removed, Signed copy of instructions placed on chart. Pt wheeled down by staff. Transport being provided by son.

## 2021-04-08 NOTE — PROGRESS NOTES
Physical Therapy: defer     Order acknowledged, chart reviewed. Patient currently IDA at this time, will follow up later today vs tomorrow as able & appropriate.      Reymundo Rosa, PT, DPT

## 2021-04-08 NOTE — PROGRESS NOTES
[facility]  [facility] [place]  [id number] [facility] [address]  [facility] [id number]  [phone]      Bisi Adorno :  1948 MRN:  [id number]    Treatment Plan:   Session 3/ of current treatment plan    2019 Time Visit Began:  1:00 pm  Time Visit Ended:  1:30 pm    Session Type:  30 Minute Therapy ([id number])  Others Present:  No     Primary Diagnosis:  Major Depression Recurrent  :  3 Severe w/o Mention of psych behav     Chief complaint in patient's own words:  \"I'm still very depressed\"    D:  Patient reports that she continues to have many days where her mood is very depressed and she has little energy to function throughout the day.  She discussed how her low mood influences her to be short-tempered much of the time.  Patient indicated that she encountered a situation where she attempted several times to contact her cat's 's office to get medicine and was not able to get the script filled and how she perceives that many of her efforts in life are like that where she then becomes exasperated.      Patient reports that she did initiate cleaning in her garage after taking some initial small steps and that she noted her mood did improve afterward.  She additionally discussed barriers to different activities in the evening because her two cats and a dog tend to lay on and around her once she sits down.      Suicide/Homicide/Violence Ideation:  Yes. Passive SI without a plan or intent.     Change in Medication(s) Reported:  No     Current Outpatient Medications   Medication Sig   • pantoprazole (PROTONIX) 40 MG tablet TAKE 1 TABLET BY MOUTH  DAILY   • clonazePAM (KLONOPIN) 2 MG tablet TAKE 1 TABLET BY MOUTH 3  TIMES A DAY AS NEEDED FOR  ANXIETY   • lisinopril (PRINIVIL,ZESTRIL) 30 MG tablet TAKE 1 TABLET BY MOUTH  DAILY   • citalopram (CELEXA) 40 MG tablet TAKE 1 TABLET BY MOUTH  DAILY   • amLODIPine (NORVASC) 5 MG tablet  TRANSFER - IN REPORT:    Verbal report received from Esau Wright (name) on Malathi Will  being received from ED(unit) for routine progression of care      Report consisted of patients Situation, Background, Assessment and   Recommendations(SBAR). Information from the following report(s) SBAR, Kardex, Intake/Output, MAR, Recent Results, Cardiac Rhythm NSR, Quality Measures and Dual Neuro Assessment was reviewed with the receiving nurse. Opportunity for questions and clarification was provided. Assessment completed upon patients arrival to unit and care assumed. TAKE 1 TABLET BY MOUTH  DAILY   • metoPROLOL tartrate (LOPRESSOR) 50 MG tablet TAKE 1 TABLET BY MOUTH TWO  TIMES DAILY   • fluticasone (FLONASE) 50 MCG/ACT nasal spray USE 2 SPRAYS IN EACH  NOSTRIL DAILY   • meclizine HCl (ANTIVERT) 25 MG tablet TAKE 1 TABLET BY MOUTH 3  TIMES DAILY AS NEEDED FOR  DIZZINESS   • NYAMYC 071952 UNIT/GM powder APPLY 1 APPLICATION  TOPICALLY 3 TIMES DAILY.   • dicyclomine (BENTYL) 10 MG capsule Take 1 capsule by mouth 4 times daily as needed for cramping   • diphenoxylate-atropine (LOMOTIL) 2.5-0.025 MG tablet Take 1 tablet by mouth 4 times daily as needed.   • DISPENSE Diabetic shoes and inserts   • ASA/butalb/caff/codeine (FIORINAL W/CODEINE) capsule Take 1 capsule by mouth every 4 hours as needed for Headaches or Migraine.   • triamcinolone (ARISTOCORT) 0.1 % cream Apply to right arm twice daily  X 2-3 weeks   • Omega-3 Fatty Acids (FISH OIL) 1200 MG capsule Take 1,200 mg by mouth daily.   • Cholecalciferol (VITAMIN D) 2000 UNITS Cap Take 2,000 Units by mouth daily.   • acetaminophen (TYLENOL) 500 MG tablet Take 500 mg by mouth 3 times daily as needed for Pain (Headache).     No current facility-administered medications for this visit.        PHQ 9 Scores  Date Adult PHQ 9 Score   10/22/2019 25   2/1/2018 16       PHQ 9:  Date Adult PHQ 9 Score   11/5/2019 25         DYLAN 7 Scores  Date DYLAN 7 Score   10/22/2019 20   10/15/2019 21        Date DYLAN 7 Score   11/5/2019 20          A:  Re-administered the Patient Health Questionnaire - 9 (PHQ-9) and Generalized Anxiety Disorder - 7 (DYLAN-7), and reviewed results with patient. Noted to the patient that her scores remain in the severe level for depression and anxiety.  Discussed the option of more intensive treatment to provide more comprehensive help to patient more rapidly.  Praised her for her self-activation efforts and encouraged her to pay close attention to changes in her mood and thoughts after she engages in goal-directed behavior.   Problem-solved ways to reduce the barrier of her pets laying on and around her at night so she might connect with other antidepressant activities such as crocheting.     R:  Patient was alert, oriented, receptive to feedback, and engaged in the therapeutic process.  Affect was anxious and sad, although she did smile and laught at times appropriate to the content of the discussion.  Patient was receptive to further exploration of more intensive treatment. Patient was also receptive to continuing to plan for small goals related to accomplishment- and pleasure-oriented activities at home. She conveyed understanding regarding education and resources provided.     P:  Plan to continue individual follow-up with next appointment scheduled for 11/12/2019.  Will investigate options for possible intensive treatment for the patient.  Patient was encouraged to contact Behavioral Health with any questions or concerns.  Will continue collaboration with patient's primary care provider, Ector Ulloa MD, regarding plan of care.     Treatment Plan:  Unchanged    Discharge Plan:  N/A      Clem Miramontes PSYD

## 2021-04-08 NOTE — PROGRESS NOTES
Transition of Care Plan   RUR- Low 7%   DISPOSITION: TBD - Likely return home    F/U with PCP/Specialist     Transport: Family - TBD    CM attempted initial assessment, patient of the floor. CM left VM for spouse requesting return call. Taqueria Cotto, M.S.W.  Supervisee

## 2021-04-08 NOTE — H&P
HISTORY AND PHYSICAL      PCP: None  History source: Patient     Patient was admitted on 4/7/2021 under a WRONG patient's chart. Per HPI:   Milena Hernandez is a 69 y.o. male with pmhx HTN, and DM II, presents for confusion, b/l leg swelling, and difficulty walking for one week. Per wife, who gives the majority of the hx, he is usually alert and oriented with no difficulty ambulation. For the past two weeks, he has had an unsteady gait, needing to hold on to furniture for aid, intermittent confusion, and lethargy described as falling asleep frequently. She called her children, one who is a physician who recommended that he be evaluated in the hospital.  She states that he was recently switched from atacand/hct to losartan/hct, and started on norvasc 5mg. The norvasc 5mg was then decreased to 2.5mg due to LE edema. In the ED, he was hypertensive to 177/74. Labs were significant for probnp 659, glucosuria, proteinuria, and moderate blood with no RBC's on micro      PMhx  -HTN  -DM  -AME     Meds:  -losartan hctz  -metformin  -MVI  -amlodipine - pt says he stopped due to leg swelling      Allergies:  PCN-reaction unknown     Family Hx  Brothers, and sister with HTN, and CVA  Sister with Alzheimers     SOCIAL HISTORY:  Patient resides at Home. Patient ambulates independently  Smoking history: none  Alcohol history: none     Objective:         Physical Exam:   Visit Vitals  BP (!) 177/74 (BP 1 Location: Left upper arm, BP Patient Position: Sitting)   Pulse 87   Temp 98.6 °F (37 °C)   Resp 20   SpO2 98%      General:  Lethargic, falls asleep easily during exam, arousable with vigorous tactile stimulation cooperative, no distress, appears stated age. Head:  Normocephalic, without obvious abnormality, atraumatic. Eyes:  Conjunctivae/corneas clear. PERRL, EOMs intact.                Throat: Lips, mucosa, and tongue normal.   Neck: Supple, symmetrical, trachea midline,    Back:   Symmetric, no curvature.  ROM normal. N   Lungs:   Clear to auscultation bilaterally. Chest wall:  No tenderness or deformity. Heart:  Regular rate and rhythm, S1, S2 normal, no murmur, click, rub or gallop. Abdomen:   Soft, non-tender. Bowel sounds normal. No masses,  No organomegaly.               Extremities: Extremities normal, atraumatic, no cyanosis or edema. Pulses: 2+ radial pulses   Skin: Skin color, texture, turgor normal. No rashes or lesions         Neurologic: CNII-XII grossly  intact. Normal strength, and sensation. Per son, pt. Has been disoriented to time      ECG: SR 84, LAD     Data Review: All diagnostic labs and studies have been reviewed.     Chest x-ray: no acute cardiopulmonary process     Assessment:      Active Problems:    * No active hospital problems.  *        Plan:      #Encephalopathy  -pt's wife reports sleepiness, and lethargy, ataxia, and intermittent confusion for the past several weeks  -CT head with no acute intracranial process, but areas of non-specific parenchymal hypodensity favoiring chronic microangiopathy   -check NH3, TSH, B12, folate, RPR, ETOH, ABG and UDS, MRI  -Ua with culture pending     #Hypo osmolar, Hyponatremia  -Na 123, 125 corrected for glucose, osmolarity 269, normal PO intake per wife  -check Cher, and Usom, TSH, and cortisol  -hold hctz  -do not correct over 6-8mEQ/L in 24 hours  -repeat BMP stat after 500cc ns  -consult nephrology     #Ataxia  -wife c/o impaired gait for the past several weeks  -CTA to eval post circulation   -MRI, and neuro consult as per above     #HTN  -prn hydralazine  -hold home norvasc 2/2 LE edema, hold losartan/hct 2/2 hyponatremia     #Daytime Sleepiness  -pt snores per wife, and was evaluated for sleep apnea in the past, he never rec'd C-pap, results of eval unclear  -need sleep study OP     #Abnormal UA  -UA with blood, glucose and protien, no RBC's on micro  -check CK     #DM II  -hold home metformin  -lispro ISS     FEN: cardiac, diabetic  dvt ppx: lovenox    MPOA: Monikaari Maradiaag (spouse)  Code: Full       Signed By: Tiny Balderas MD     April 8, 2021

## 2021-04-08 NOTE — PROGRESS NOTES
OCCUPATIONAL THERAPY EVALUATION/DISCHARGE  Patient: Gely Moralez (28 y.o. male)  Date: 4/8/2021  Primary Diagnosis: Hyponatremia [E87.1]       Precautions:      ASSESSMENT  Based on the objective data described below, the patient presents with return to near baseline function s/p admission for leg weakness, ataxia, and AMS, now mostly resolved, currently undergoing a CVA workup, MRI pending. PTA, patient IND, lives with family; he now present close to his baseline, largely IND-CGA for dynamic standing ADLs and mobility. Son present and supportive, educated on increased OOB activity, seated vs standing exercises with small weights, and returning to functional activities upon d/c home. No further OT needs, has good family support at home. Current Level of Function (ADLs/self-care): IND-CGA for ADLs and mobility    Functional Outcome Measure: The patient scored 85/100 on the Barthel Index outcome measure which is indicative of 15% impairment in ADLs and mobility, infer not more than 15% dependence on others for IADLs. Other factors to consider for discharge: good family support     PLAN :  Recommend with staff: Recommend with nursing, ADLs with supervision/setup, once Egress Test completed then OOB to chair 3x/day and toileting via functional mobility to and from bathroom with 1 assist. Thank you for completing as able in order to maintain patient strength, endurance and independence. Recommendation for discharge: (in order for the patient to meet his/her long term goals)  No skilled occupational therapy/ follow up rehabilitation needs identified at this time. This discharge recommendation:  A follow-up discussion with the attending provider and/or case management is planned    IF patient discharges home will need the following DME: none       SUBJECTIVE:   Patient stated I feel better.     OBJECTIVE DATA SUMMARY:   HISTORY:   No past medical history on file. No past surgical history on file.     Prior Level of Function/Environment/Context: IND, lives with family  Expanded or extensive additional review of patient history:   Home Situation  Home Environment: Private residence  # Steps to Enter: 4  Rails to Enter: Yes  Hand Rails : Bilateral  One/Two Story Residence: (three stories)  Living Alone: No  Support Systems: Spouse/Significant Other/Partner, Child(susie)  Current DME Used/Available at Home: None  Tub or Shower Type: Tub/Shower combination    Hand dominance: Right    EXAMINATION OF PERFORMANCE DEFICITS:  Cognitive/Behavioral Status:  Neurologic State: Alert  Orientation Level: Oriented X4  Cognition: Appropriate decision making; Appropriate for age attention/concentration; Appropriate safety awareness; Follows commands  Perception: Appears intact  Perseveration: No perseveration noted  Safety/Judgement: Awareness of environment; Fall prevention    Skin: Appears intact    Edema: None    Hearing:       Vision/Perceptual:    Tracking: Able to track stimulus in all quadrants w/o difficulty                 Diplopia: No    Acuity: Within Defined Limits         Range of Motion:  AROM: Within functional limits  PROM: Within functional limits                      Strength:  Strength: Within functional limits                Coordination:  Coordination: Within functional limits  Fine Motor Skills-Upper: Left Intact; Right Intact    Gross Motor Skills-Upper: Left Intact; Right Intact    Tone & Sensation:  Tone: Normal  Sensation: Intact                      Balance:  Sitting: Intact  Standing: Intact    Functional Mobility and Transfers for ADLs:  Bed Mobility:  Supine to Sit: Independent  Sit to Supine: Independent  Scooting: Independent    Transfers:  Sit to Stand: Stand-by assistance  Stand to Sit: Stand-by assistance  Bathroom Mobility: Stand-by assistance  Tub Transfer: Contact guard assistance  Assistive Device : Gait Belt    ADL Assessment:  Feeding: Independent    Oral Facial Hygiene/Grooming: Supervision    Bathing: Contact guard assistance    Upper Body Dressing: Independent    Lower Body Dressing: Stand-by assistance    Toileting: Stand by assistance                ADL Intervention and task modifications:     Lower Body Dressing Assistance  Socks: Independent    Toileting  Toileting Assistance: Stand-by assistance(simulated in bathroom)  Bladder Hygiene: Independent  Bowel Hygiene: Modified indpendent  Clothing Management: Stand-by assistance  Cues: Verbal cues provided  Adaptive Equipment: Grab bars    Cognitive Retraining  Safety/Judgement: Awareness of environment; Fall prevention    Patient instructed and indicated understanding home modifications (ie raise height of ADL objects, appropriate chair heights, recliner safety), safety (ie change of floor surfaces, clear pathways), to increase independence and fall prevention with. Patient instructed and indicated understanding the benefits of maintaining activity tolerance, functional mobility, and independence with self care tasks during acute stay  to ensure safe return home and to baseline. Encouraged patient to increase frequency and duration OOB, be out of bed for all meals, perform daily ADLs (as approved by RN/MD regarding bathing etc), and performing functional mobility to/from bathroom with SBA. Functional Measure:  Barthel Index:    Bathin  Bladder: 10  Bowels: 10  Groomin  Dressing: 10  Feeding: 10  Mobility: 10  Stairs: 5  Toilet Use: 10  Transfer (Bed to Chair and Back): 10  Total: 85/100        The Barthel ADL Index: Guidelines  1. The index should be used as a record of what a patient does, not as a record of what a patient could do. 2. The main aim is to establish degree of independence from any help, physical or verbal, however minor and for whatever reason. 3. The need for supervision renders the patient not independent. 4. A patient's performance should be established using the best available evidence.  Asking the patient, friends/relatives and nurses are the usual sources, but direct observation and common sense are also important. However direct testing is not needed. 5. Usually the patient's performance over the preceding 24-48 hours is important, but occasionally longer periods will be relevant. 6. Middle categories imply that the patient supplies over 50 per cent of the effort. 7. Use of aids to be independent is allowed. Thomas Cordova., Barthel, D.W. (7019). Functional evaluation: the Barthel Index. [address] [facility] (14)2. OMI Strange, Angella Oconnor, Eron Naranjo., Kathleen, [address] [facility] (1999). Measuring the change indisability after inpatient rehabilitation; comparison of the responsiveness of the Barthel Index and Functional [place] Measure. Journal of Neurology, Neurosurgery, and Psychiatry, 66(4), 875-354. Fernando De Paz, SHERLEY, ARLETH Mathews, & Hollis Monge, MDrakeA. (2004.) Assessment of post-stroke quality of life in cost-effectiveness studies: The usefulness of the Barthel Index and the EuroQoL-5D. Quality of Life Research, 15, 970-40         Occupational Therapy Evaluation Charge Determination   History Examination Decision-Making   LOW Complexity : Brief history review  LOW Complexity : 1-3 performance deficits relating to physical, cognitive , or psychosocial skils that result in activity limitations and / or participation restrictions  LOW Complexity : No comorbidities that affect functional and no verbal or physical assistance needed to complete eval tasks       Based on the above components, the patient evaluation is determined to be of the following complexity level: LOW   Pain Rating:  None    Activity Tolerance: WNL    After treatment patient left in no apparent distress:    Supine in bed, Call bell within reach, Caregiver / family present and Side rails x 3    COMMUNICATION/EDUCATION:   The patients plan of care was discussed with: Physical therapist and Registered nurse.      Thank you for this referral.  FRANCIA Thorne, OTR/L  Time Calculation: 17 mins

## 2021-04-08 NOTE — PROGRESS NOTES
Occupational Therapy  04/08/21    1344: Order acknowledged, chart reviewed. Patient currently IDA at this time, will follow up later today vs tomorrow as able & appropriate. 1502: Orders received, chart reviewed and patient evaluated by occupational therapy. Pending progression with skilled acute occupational therapy, recommend:  No skilled occupational therapy/ follow up rehabilitation needs identified at this time. Recommend with nursing ADLs with supervision/setup, OOB to chair 3x/day and toileting via functional mobility to and from bathroom with 1 assist. Thank you for completing as able in order to maintain patient strength, endurance and independence. Full evaluation to follow.        Thank you,   Nioclasa Villafuerte, FRANCIA, OTR/L

## 2021-04-08 NOTE — ED NOTES
Awaiting a disposition at this time and results; no apparent distress noted. His spouse remains at the bedside.

## 2021-04-08 NOTE — ED PROVIDER NOTES
Chief Complaint   Patient presents with    Altered mental status     This is a 54-year-old male presenting with confusion and gait instability for the past week. His wife reports that he has had difficulty walking without holding onto a wall next to him for stability. No recent falls or head trauma. He has also been more tired and somnolent than usual.  At the time of my exam he is conversant but cannot state the correct month (believes it is September), acknowledges poor oral intake at home with inadequate hydration, otherwise denies any cough, chest pain, abdominal pain, vomiting or diarrhea. His wife has noticed some exertional dyspnea recently but he does not feel short of breath at rest.  She was concerned about the swelling in his feet which has not responded to compression stockings. He denies any asymmetric lower extremity pain or edema, any prior history of DVT/PE. No other systemic complaints. Symptoms are moderate in nature without alleviating factors.       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    Neck pain        Past Surgical History:   Procedure Laterality Date    HX COLONOSCOPY           Family History:   Problem Relation Age of Onset    Arthritis-osteo Mother     Asthma Mother     Cancer Mother         breast    Heart Disease Mother     Hypertension Mother    Fco Edwin Migraines Mother     Alcohol abuse Father     Diabetes Sister     Hypertension Sister     Alcohol abuse Sister     Cancer Brother         prostate    Alcohol abuse Brother        Social History     Socioeconomic History    Marital status:      Spouse name: Not on file    Number of children: Not on file    Years of education: Not on file    Highest education level: Not on file   Occupational History    Not on file   Social Needs    Financial resource strain: Not hard at all    Food insecurity     Worry: Never true     Inability: Never true    Transportation needs     Medical: Patient refused     Non-medical: Patient refused   Tobacco Use    Smoking status: Never Smoker    Smokeless tobacco: Never Used   Substance and Sexual Activity    Alcohol use: No    Drug use: No    Sexual activity: Not on file   Lifestyle    Physical activity     Days per week: 7 days     Minutes per session: 20 min    Stress: Not on file   Relationships    Social connections     Talks on phone: More than three times a week     Gets together: Patient refused     Attends Sabianist service: More than 4 times per year     Active member of club or organization: Yes     Attends meetings of clubs or organizations: 1 to 4 times per year     Relationship status:     Intimate partner violence     Fear of current or ex partner: Not on file     Emotionally abused: Not on file     Physically abused: Not on file     Forced sexual activity: Not on file   Other Topics Concern    Not on file   Social History Narrative    Not on file         ALLERGIES: Pcn [penicillins]    Review of Systems   Constitutional: Negative for fever. HENT: Negative for facial swelling. Eyes: Negative for visual disturbance. Respiratory: Positive for shortness of breath. Cardiovascular: Negative for chest pain. Gastrointestinal: Negative for abdominal pain and vomiting. Genitourinary: Negative for difficulty urinating. Musculoskeletal: Positive for gait problem. Negative for neck pain. Neurological: Negative for headaches. Psychiatric/Behavioral: Positive for confusion.        Vitals:    04/07/21 1554   BP: (!) 177/74   Pulse: 87   Resp: 20   Temp: 98.6 °F (37 °C)   SpO2: 98%            Physical Exam  General:  Awake and alert, NAD  HEENT:  NC/AT, equal pupils, moist mucous membranes  Neck:   Normal inspection, full range of motion  Cardiac:  RRR, no murmurs  Respiratory:  Clear bilaterally, no wheezes, rales, rhonchi  Abdomen:  Soft and nontender, nondistended  Extremities: Warm and well perfused, no peripheral edema  Neuro:  Disoriented to time (believes it is September), moving all extremities symmetrically without gross motor deficit  Skin:   No rashes or pallor    RESULTS  Recent Results (from the past 12 hour(s))   NT-PRO BNP    Collection Time: 04/07/21  4:26 PM   Result Value Ref Range    NT pro- (H) <125 PG/ML   EKG, 12 LEAD, INITIAL    Collection Time: 04/07/21  4:32 PM   Result Value Ref Range    Ventricular Rate 82 BPM    Atrial Rate 82 BPM    P-R Interval 176 ms    QRS Duration 86 ms    Q-T Interval 326 ms    QTC Calculation (Bezet) 380 ms    Calculated P Axis 57 degrees    Calculated R Axis -34 degrees    Calculated T Axis 63 degrees    Diagnosis       Normal sinus rhythm  Left axis deviation  No previous ECGs available     URINALYSIS W/MICROSCOPIC    Collection Time: 04/07/21  9:24 PM   Result Value Ref Range    Color YELLOW/STRAW      Appearance CLEAR CLEAR      Specific gravity 1.011 1.003 - 1.030      pH (UA) 6.5 5.0 - 8.0      Protein 30 (A) NEG mg/dL    Glucose 500 (A) NEG mg/dL    Ketone TRACE (A) NEG mg/dL    Bilirubin Negative NEG      Blood MODERATE (A) NEG      Urobilinogen 0.2 0.2 - 1.0 EU/dL    Nitrites Negative NEG      Leukocyte Esterase Negative NEG      WBC 0-4 0 - 4 /hpf    RBC 10-20 0 - 5 /hpf    Epithelial cells FEW FEW /lpf    Bacteria Negative NEG /hpf    Hyaline cast 0-2 0 - 5 /lpf   URINE CULTURE HOLD SAMPLE    Collection Time: 04/07/21  9:24 PM    Specimen: Serum; Urine   Result Value Ref Range    Urine culture hold        Urine on hold in Microbiology dept for 2 days. If unpreserved urine is submitted, it cannot be used for addtional testing after 24 hours, recollection will be required. IMAGING  Ct Head Wo Cont    Result Date: 4/7/2021  No apparent acute intracranial finding. Xr Chest Port    Result Date: 4/7/2021  No Acute Disease.        Procedures - none unless documented below  EKG as interpreted by me:  Normal sinus rhythm at a rate of 82, axis deviation, normal intervals, grossly no ST or T wave changes suggesting acute ischemia. ED course: Labs, EKG and imaging reviewed. CBC, CMP, troponin could not be uploaded in [facility] so please refer to the hard copy for results. Presented with one week of confusion, difficulty walking, reported swelling in the feet (although no peripheral edema on my exam). Sodium is 123, [facility] will not load his labs into the system. Chest film clear without masses, CT head unremarkable. Reports poor fluid intake. UA currently pending. Will admit for continued management, discussed with the hospitalist.    Hospitalist Sophie Serve for Admission  10:01 PM    ED Room Number:   ER15/15  Patient Name and age:  [place] 69 y.o.  male  Working Diagnosis:     1. Hyponatremia    2. Gait instability    3. Altered mental status, unspecified altered mental status type      COVID suspicion:   no  Code Status:    Full Code  Readmission:    no  Isolation Requirements:  no  Recommended Level of Care: stepdown  Department:    [facility] Adult ED - 21   Other:     Presented with one week of confusion, difficulty walking, reported swelling in the feet (although no peripheral edema on my exam). Sodium is 123, [facility] will not load his labs into the system. Chest film clear without masses, CT head unremarkable. Reports poor fluid intake. UA currently pending.

## 2022-03-18 PROBLEM — E87.1 HYPONATREMIA: Status: ACTIVE | Noted: 2021-04-08
